# Patient Record
Sex: FEMALE | Race: WHITE | NOT HISPANIC OR LATINO | URBAN - METROPOLITAN AREA
[De-identification: names, ages, dates, MRNs, and addresses within clinical notes are randomized per-mention and may not be internally consistent; named-entity substitution may affect disease eponyms.]

---

## 2018-01-02 ENCOUNTER — EMERGENCY (EMERGENCY)
Facility: HOSPITAL | Age: 43
LOS: 1 days | End: 2018-01-02
Payer: COMMERCIAL

## 2018-01-02 PROCEDURE — 71045 X-RAY EXAM CHEST 1 VIEW: CPT | Mod: 26

## 2018-01-02 PROCEDURE — 99285 EMERGENCY DEPT VISIT HI MDM: CPT

## 2018-10-09 ENCOUNTER — EMERGENCY (EMERGENCY)
Facility: HOSPITAL | Age: 43
LOS: 1 days | End: 2018-10-09
Payer: COMMERCIAL

## 2018-10-09 PROCEDURE — 71045 X-RAY EXAM CHEST 1 VIEW: CPT | Mod: 26

## 2018-10-09 PROCEDURE — 99284 EMERGENCY DEPT VISIT MOD MDM: CPT

## 2021-08-12 ENCOUNTER — OUTPATIENT (OUTPATIENT)
Dept: OUTPATIENT SERVICES | Facility: HOSPITAL | Age: 46
LOS: 1 days | End: 2021-08-12
Payer: COMMERCIAL

## 2021-08-12 PROCEDURE — 93010 ELECTROCARDIOGRAM REPORT: CPT

## 2021-08-16 ENCOUNTER — APPOINTMENT (OUTPATIENT)
Dept: DISASTER EMERGENCY | Facility: CLINIC | Age: 46
End: 2021-08-16

## 2021-08-16 DIAGNOSIS — Z01.818 ENCOUNTER FOR OTHER PREPROCEDURAL EXAMINATION: ICD-10-CM

## 2021-08-16 PROBLEM — Z00.00 ENCOUNTER FOR PREVENTIVE HEALTH EXAMINATION: Status: ACTIVE | Noted: 2021-08-16

## 2021-08-17 LAB — SARS-COV-2 N GENE NPH QL NAA+PROBE: NOT DETECTED

## 2021-08-19 ENCOUNTER — OUTPATIENT (OUTPATIENT)
Dept: OUTPATIENT SERVICES | Facility: HOSPITAL | Age: 46
LOS: 1 days | End: 2021-08-19

## 2021-08-21 ENCOUNTER — EMERGENCY (EMERGENCY)
Facility: HOSPITAL | Age: 46
LOS: 1 days | End: 2021-08-21
Admitting: EMERGENCY MEDICINE
Payer: COMMERCIAL

## 2021-08-21 PROCEDURE — 99284 EMERGENCY DEPT VISIT MOD MDM: CPT

## 2021-08-22 ENCOUNTER — EMERGENCY (EMERGENCY)
Facility: HOSPITAL | Age: 46
LOS: 1 days | End: 2021-08-22
Admitting: EMERGENCY MEDICINE
Payer: COMMERCIAL

## 2021-08-22 PROCEDURE — 99283 EMERGENCY DEPT VISIT LOW MDM: CPT

## 2021-08-23 ENCOUNTER — EMERGENCY (EMERGENCY)
Facility: HOSPITAL | Age: 46
LOS: 1 days | End: 2021-08-23
Admitting: EMERGENCY MEDICINE
Payer: COMMERCIAL

## 2021-08-23 PROCEDURE — 99284 EMERGENCY DEPT VISIT MOD MDM: CPT

## 2022-07-26 ENCOUNTER — OUTPATIENT (OUTPATIENT)
Dept: OUTPATIENT SERVICES | Facility: HOSPITAL | Age: 47
LOS: 1 days | End: 2022-07-26
Payer: COMMERCIAL

## 2022-07-26 DIAGNOSIS — R79.9 ABNORMAL FINDING OF BLOOD CHEMISTRY, UNSPECIFIED: ICD-10-CM

## 2022-07-27 ENCOUNTER — APPOINTMENT (OUTPATIENT)
Dept: HEMATOLOGY ONCOLOGY | Facility: CLINIC | Age: 47
End: 2022-07-27

## 2022-07-27 ENCOUNTER — RESULT REVIEW (OUTPATIENT)
Age: 47
End: 2022-07-27

## 2022-07-27 VITALS
HEART RATE: 76 BPM | WEIGHT: 117 LBS | TEMPERATURE: 97.9 F | HEIGHT: 63 IN | DIASTOLIC BLOOD PRESSURE: 76 MMHG | SYSTOLIC BLOOD PRESSURE: 118 MMHG | BODY MASS INDEX: 20.73 KG/M2

## 2022-07-27 DIAGNOSIS — R92.2 INCONCLUSIVE MAMMOGRAM: ICD-10-CM

## 2022-07-27 LAB
BASOPHILS # BLD AUTO: 0.03 K/UL — SIGNIFICANT CHANGE UP (ref 0–0.2)
BASOPHILS NFR BLD AUTO: 0.6 % — SIGNIFICANT CHANGE UP (ref 0–2)
EOSINOPHIL # BLD AUTO: 0.16 K/UL — SIGNIFICANT CHANGE UP (ref 0–0.5)
EOSINOPHIL NFR BLD AUTO: 3.3 % — SIGNIFICANT CHANGE UP (ref 0–6)
HCT VFR BLD CALC: 39.1 % — SIGNIFICANT CHANGE UP (ref 34.5–45)
HGB BLD-MCNC: 13.3 G/DL — SIGNIFICANT CHANGE UP (ref 11.5–15.5)
IMM GRANULOCYTES NFR BLD AUTO: 0.2 % — SIGNIFICANT CHANGE UP (ref 0–1.5)
LYMPHOCYTES # BLD AUTO: 1.75 K/UL — SIGNIFICANT CHANGE UP (ref 1–3.3)
LYMPHOCYTES # BLD AUTO: 36 % — SIGNIFICANT CHANGE UP (ref 13–44)
MCHC RBC-ENTMCNC: 32.1 PG — SIGNIFICANT CHANGE UP (ref 27–34)
MCHC RBC-ENTMCNC: 34 GM/DL — SIGNIFICANT CHANGE UP (ref 32–36)
MCV RBC AUTO: 94.4 FL — SIGNIFICANT CHANGE UP (ref 80–100)
MONOCYTES # BLD AUTO: 0.39 K/UL — SIGNIFICANT CHANGE UP (ref 0–0.9)
MONOCYTES NFR BLD AUTO: 8 % — SIGNIFICANT CHANGE UP (ref 2–14)
NEUTROPHILS # BLD AUTO: 2.52 K/UL — SIGNIFICANT CHANGE UP (ref 1.8–7.4)
NEUTROPHILS NFR BLD AUTO: 51.9 % — SIGNIFICANT CHANGE UP (ref 43–77)
NRBC # BLD: 0 /100 WBCS — SIGNIFICANT CHANGE UP (ref 0–0)
PLATELET # BLD AUTO: 167 K/UL — SIGNIFICANT CHANGE UP (ref 150–400)
RBC # BLD: 4.14 M/UL — SIGNIFICANT CHANGE UP (ref 3.8–5.2)
RBC # FLD: 12.2 % — SIGNIFICANT CHANGE UP (ref 10.3–14.5)
WBC # BLD: 4.86 K/UL — SIGNIFICANT CHANGE UP (ref 3.8–10.5)
WBC # FLD AUTO: 4.86 K/UL — SIGNIFICANT CHANGE UP (ref 3.8–10.5)

## 2022-07-27 PROCEDURE — 85027 COMPLETE CBC AUTOMATED: CPT

## 2022-07-27 PROCEDURE — 36415 COLL VENOUS BLD VENIPUNCTURE: CPT

## 2022-07-27 PROCEDURE — 99205 OFFICE O/P NEW HI 60 MIN: CPT

## 2022-07-27 RX ORDER — EPINEPHRINE 0.15 MG/.3ML
0.15 INJECTION INTRAMUSCULAR
Refills: 0 | Status: ACTIVE | COMMUNITY

## 2022-07-27 RX ORDER — ESCITALOPRAM OXALATE 20 MG/1
20 TABLET ORAL
Qty: 90 | Refills: 0 | Status: ACTIVE | COMMUNITY
Start: 2022-03-30

## 2022-07-27 RX ORDER — RIMEGEPANT SULFATE 75 MG/75MG
75 TABLET, ORALLY DISINTEGRATING ORAL
Qty: 16 | Refills: 0 | Status: ACTIVE | COMMUNITY
Start: 2022-07-15

## 2022-07-27 NOTE — RESULTS/DATA
[FreeTextEntry1] : Ms. Silva presented at age 47 in July 2022 for evaluation of von willebrand disease, elevated ferritin. \par The patient has a medical history of anxiety, Anorexia nervosa, SVT s/p cardiac ablation, migraines, osteopenia, von willebrand disease, unknown type. \par \par Will obtain prior records. \par Baseline labs today. \par Consider DDAVP prior to colonoscopy. \par \par Laboratory studies reviewed at today's visit and notable for: WBC 4.86, Hb 13.3, Plt 167

## 2022-07-27 NOTE — CONSULT LETTER
[Dear  ___] : Dear  [unfilled], [Courtesy Letter:] : I had the pleasure of seeing your patient, [unfilled], in my office today. [Please see my note below.] : Please see my note below. [Consult Closing:] : Thank you very much for allowing me to participate in the care of this patient.  If you have any questions, please do not hesitate to contact me. [Sincerely,] : Sincerely, [FreeTextEntry2] : Dr. Elda Juarez  [FreeTextEntry3] : Dr. Citlali Bhat\par

## 2022-07-27 NOTE — HISTORY OF PRESENT ILLNESS
[de-identified] : Referred by: Dr. Elda Juarez\par \par Ms. Silva presented at age 47 in July 2022 for evaluation of von willebrand disease, elevated ferritin. \par The patient has a medical history of anxiety, Anorexia nervosa, SVT s/p cardiac ablation, migraines, osteopenia, von willebrand disease, unknown type. \par \par The patient is here to transfer care from [facility].  She reports that she was previously diagnosed with von Willebrand disorder by a hematologist in [facility] Dr. Humza Sorto at [facility] over 20 years ago.  She underwent this work-up because she had extremely heavy bleeding with her menses and had an episode of hemorrhage after oral surgery. The patient had 1 vaginal delivery 12 years ago which was unremarkable, no major bleeding noted. She was unable to get pregnant after that delivery. She subsequently underwent uterine ablation and had significant improvement in her menses. She had a recent nasal procedure and required 3 doses of DDAVP (Stimate) to control her bleeding. She never required a blood transfusion. She also reports that her grandmother had a bleeding disorder, likely von Willebrand as well.  \par \par She has seen multiple hematologists over the past several years who told her that she did not in fact have VWD, however she continues to suffer from bleeding episodes with surgery. She moved out to [facility] in 2003 and most recently was following with Dr. Gamboa and Dr. Underwood. She was also receiving intermittent IV iron infusions for iron deficiency anemia, most recently 6 years ago. She recently was found to have elevated ferritin which is being monitored. \par \par She reports a significant history of Anorexia, gained 30lb over the past year and is doing a lot of yoga, meditation, acupuncture, massage. She does report generally low blood pressure and has intermittent dizziness. She also reports chronic fatigue. She reports that she is due for a colonoscopy, had some blood in her stools. 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

## 2022-07-27 NOTE — PHYSICAL EXAM
[Fully active, able to carry on all pre-disease performance without restriction] : Status 0 - Fully active, able to carry on all pre-disease performance without restriction [Normal] : affect appropriate [de-identified] : generally well appearing young female, NAD, pleasant

## 2022-07-28 ENCOUNTER — NON-APPOINTMENT (OUTPATIENT)
Age: 47
End: 2022-07-28

## 2022-07-28 LAB
ALBUMIN SERPL ELPH-MCNC: 4.6 G/DL
ALP BLD-CCNC: 85 U/L
ALT SERPL-CCNC: 17 U/L
ANION GAP SERPL CALC-SCNC: 10 MMOL/L
APTT BLD: 33.6 SEC
AST SERPL-CCNC: 18 U/L
BILIRUB SERPL-MCNC: 0.2 MG/DL
BUN SERPL-MCNC: 7 MG/DL
CALCIUM SERPL-MCNC: 9.2 MG/DL
CHLORIDE SERPL-SCNC: 103 MMOL/L
CO2 SERPL-SCNC: 27 MMOL/L
CREAT SERPL-MCNC: 0.66 MG/DL
EGFR: 108 ML/MIN/1.73M2
FERRITIN SERPL-MCNC: 400 NG/ML
FOLATE SERPL-MCNC: 14.7 NG/ML
GLUCOSE SERPL-MCNC: 83 MG/DL
INR PPP: 0.91 RATIO
IRON SATN MFR SERPL: 29 %
IRON SERPL-MCNC: 82 UG/DL
POTASSIUM SERPL-SCNC: 4.3 MMOL/L
PROT SERPL-MCNC: 6.7 G/DL
PT BLD: 10.7 SEC
SODIUM SERPL-SCNC: 140 MMOL/L
TIBC SERPL-MCNC: 283 UG/DL
UIBC SERPL-MCNC: 201 UG/DL
VIT B12 SERPL-MCNC: 628 PG/ML
VWF AG PPP IA-ACNC: 89 %

## 2022-07-29 ENCOUNTER — APPOINTMENT (OUTPATIENT)
Dept: MAMMOGRAPHY | Facility: CLINIC | Age: 47
End: 2022-07-29

## 2022-07-29 ENCOUNTER — RESULT REVIEW (OUTPATIENT)
Age: 47
End: 2022-07-29

## 2022-07-29 PROCEDURE — 77067 SCR MAMMO BI INCL CAD: CPT

## 2022-07-29 PROCEDURE — 77063 BREAST TOMOSYNTHESIS BI: CPT

## 2022-08-03 LAB — VWF MULTIMERS PPP IA-ACNC: NORMAL

## 2022-08-15 ENCOUNTER — APPOINTMENT (OUTPATIENT)
Dept: HEMATOLOGY ONCOLOGY | Facility: CLINIC | Age: 47
End: 2022-08-15

## 2022-09-15 ENCOUNTER — OUTPATIENT (OUTPATIENT)
Dept: OUTPATIENT SERVICES | Facility: HOSPITAL | Age: 47
LOS: 1 days | End: 2022-09-15
Payer: COMMERCIAL

## 2022-09-15 DIAGNOSIS — R79.9 ABNORMAL FINDING OF BLOOD CHEMISTRY, UNSPECIFIED: ICD-10-CM

## 2022-09-19 ENCOUNTER — APPOINTMENT (OUTPATIENT)
Dept: HEMATOLOGY ONCOLOGY | Facility: CLINIC | Age: 47
End: 2022-09-19

## 2022-10-06 ENCOUNTER — APPOINTMENT (OUTPATIENT)
Dept: HEMATOLOGY ONCOLOGY | Facility: CLINIC | Age: 47
End: 2022-10-06

## 2022-10-07 ENCOUNTER — RESULT REVIEW (OUTPATIENT)
Age: 47
End: 2022-10-07

## 2022-10-07 ENCOUNTER — APPOINTMENT (OUTPATIENT)
Dept: HEMATOLOGY ONCOLOGY | Facility: CLINIC | Age: 47
End: 2022-10-07

## 2022-10-07 LAB
BASOPHILS # BLD AUTO: 0.03 K/UL — SIGNIFICANT CHANGE UP (ref 0–0.2)
BASOPHILS NFR BLD AUTO: 0.5 % — SIGNIFICANT CHANGE UP (ref 0–2)
EOSINOPHIL # BLD AUTO: 0.21 K/UL — SIGNIFICANT CHANGE UP (ref 0–0.5)
EOSINOPHIL NFR BLD AUTO: 3.7 % — SIGNIFICANT CHANGE UP (ref 0–6)
HCT VFR BLD CALC: 35.9 % — SIGNIFICANT CHANGE UP (ref 34.5–45)
HGB BLD-MCNC: 12.2 G/DL — SIGNIFICANT CHANGE UP (ref 11.5–15.5)
IMM GRANULOCYTES NFR BLD AUTO: 0.2 % — SIGNIFICANT CHANGE UP (ref 0–0.9)
LYMPHOCYTES # BLD AUTO: 2.26 K/UL — SIGNIFICANT CHANGE UP (ref 1–3.3)
LYMPHOCYTES # BLD AUTO: 39.4 % — SIGNIFICANT CHANGE UP (ref 13–44)
MCHC RBC-ENTMCNC: 31.6 PG — SIGNIFICANT CHANGE UP (ref 27–34)
MCHC RBC-ENTMCNC: 34 GM/DL — SIGNIFICANT CHANGE UP (ref 32–36)
MCV RBC AUTO: 93 FL — SIGNIFICANT CHANGE UP (ref 80–100)
MONOCYTES # BLD AUTO: 0.36 K/UL — SIGNIFICANT CHANGE UP (ref 0–0.9)
MONOCYTES NFR BLD AUTO: 6.3 % — SIGNIFICANT CHANGE UP (ref 2–14)
NEUTROPHILS # BLD AUTO: 2.86 K/UL — SIGNIFICANT CHANGE UP (ref 1.8–7.4)
NEUTROPHILS NFR BLD AUTO: 49.9 % — SIGNIFICANT CHANGE UP (ref 43–77)
NRBC # BLD: 0 /100 WBCS — SIGNIFICANT CHANGE UP (ref 0–0)
PLATELET # BLD AUTO: 167 K/UL — SIGNIFICANT CHANGE UP (ref 150–400)
RBC # BLD: 3.86 M/UL — SIGNIFICANT CHANGE UP (ref 3.8–5.2)
RBC # FLD: 12.4 % — SIGNIFICANT CHANGE UP (ref 10.3–14.5)
WBC # BLD: 5.73 K/UL — SIGNIFICANT CHANGE UP (ref 3.8–10.5)
WBC # FLD AUTO: 5.73 K/UL — SIGNIFICANT CHANGE UP (ref 3.8–10.5)

## 2022-10-07 PROCEDURE — 36415 COLL VENOUS BLD VENIPUNCTURE: CPT

## 2022-10-07 PROCEDURE — 99214 OFFICE O/P EST MOD 30 MIN: CPT

## 2022-10-07 PROCEDURE — 85027 COMPLETE CBC AUTOMATED: CPT

## 2022-10-07 NOTE — PHYSICAL EXAM
[Fully active, able to carry on all pre-disease performance without restriction] : Status 0 - Fully active, able to carry on all pre-disease performance without restriction [Normal] : affect appropriate [de-identified] : generally well appearing young female, NAD, pleasant

## 2022-10-07 NOTE — HISTORY OF PRESENT ILLNESS
[de-identified] : Referred by: Dr. Elda Juarez\par \par Ms. Silva presented at age 47 in July 2022 for evaluation of von willebrand disease, elevated ferritin. \par The patient has a medical history of anxiety, Anorexia nervosa, SVT s/p cardiac ablation, migraines, osteopenia, von willebrand disease, unknown type. \par \par Presenting HPI: The patient is here to transfer care from [facility].  She reports that she was previously diagnosed with von Willebrand disorder by a hematologist in [facility] Dr. Humza Sorto at [facility] over 20 years ago.  She underwent this work-up because she had extremely heavy bleeding with her menses and had an episode of hemorrhage after oral surgery. The patient had 1 vaginal delivery 12 years ago which was unremarkable, no major bleeding noted. She was unable to get pregnant after that delivery. She subsequently underwent uterine ablation and had significant improvement in her menses. She had a recent nasal procedure and required 3 doses of DDAVP (Stimate) to control her bleeding. She never required a blood transfusion. She also reports that her grandmother had a bleeding disorder, likely von Willebrand as well.  \par \par She has seen multiple hematologists over the past several years who told her that she did not in fact have VWD, however she continues to suffer from bleeding episodes with surgery. She moved out to [facility] in 2003 and most recently was following with Dr. Gamboa and Dr. Underwood. She was also receiving intermittent IV iron infusions for iron deficiency anemia, most recently 6 years ago. She recently was found to have elevated ferritin which is being monitored. \par \par She reports a significant history of Anorexia, gained 30lb over the past year and is doing a lot of yoga, meditation, acupuncture, massage. She does report generally low blood pressure and has intermittent dizziness. She also reports chronic fatigue. She reports that she is due for a colonoscopy, had some blood in her stools. 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 \par \par Labs from 7/27/22 reviewed: WBC 4.86, Hb 13.3, Plt 167\par B12 628, folate 14.7 WNL \par Ferritin 400, iron studies normal \par CMP wnl, coags wnl \par VWF antigen 89% WNL, multimers normal \par \par Mammo 7/29/22- negative, BIRADS 1\par \par Prior records from [facility] reviewed: \par Elevated ferritin levels- hemochromatosis testing negative, AYAH neg\par - Ferritin 568 in 9/2021 \par - Ferritin 704 1/2021\par Ferritin elevated since iron infusion in 2019\par \par At today's visit she is feeling very well. She denies any fevers, chills, CP, SOB, n/v/d, bleeding/bruising. She has a good appetite. \par \par Laboratory studies reviewed at today's visit and notable for: WBC 5.73, Hb 12.2, Plt 167\par \par     HCM: \par     - MMG 7/29/22- BIRADS 1 negative\par     - DUE for pap smear, has appt October \par     - DUE for DEXA\par     - DUE for colonoscopy

## 2022-10-07 NOTE — RESULTS/DATA
[FreeTextEntry1] : Ms. Silva presented at age 47 in July 2022 for evaluation of von willebrand disease, elevated ferritin. \par The patient has a medical history of anxiety, Anorexia nervosa, SVT s/p cardiac ablation, migraines, osteopenia, von willebrand disease, unknown type. \par \par Von willebrand studies negative but clinically + for VWD. \par Recommend DDAVP prior to invasive procedures including colonoscopy. \par Refer to GI for c-scope. \par Ferritin downtrending, continue to monitor. \par F/U July 2023 or as needed.

## 2022-10-10 LAB — FERRITIN SERPL-MCNC: 438 NG/ML

## 2023-01-17 ENCOUNTER — APPOINTMENT (OUTPATIENT)
Dept: MRI IMAGING | Facility: CLINIC | Age: 48
End: 2023-01-17
Payer: COMMERCIAL

## 2023-01-17 PROCEDURE — A9585: CPT

## 2023-01-17 PROCEDURE — 77049 MRI BREAST C-+ W/CAD BI: CPT

## 2023-03-07 ENCOUNTER — APPOINTMENT (OUTPATIENT)
Dept: ORTHOPEDIC SURGERY | Facility: CLINIC | Age: 48
End: 2023-03-07
Payer: COMMERCIAL

## 2023-03-07 ENCOUNTER — FORM ENCOUNTER (OUTPATIENT)
Age: 48
End: 2023-03-07

## 2023-03-07 VITALS — BODY MASS INDEX: 20.73 KG/M2 | WEIGHT: 117 LBS | HEIGHT: 63 IN

## 2023-03-07 DIAGNOSIS — M25.559 PAIN IN UNSPECIFIED HIP: ICD-10-CM

## 2023-03-07 DIAGNOSIS — M87.051 IDIOPATHIC ASEPTIC NECROSIS OF RIGHT FEMUR: ICD-10-CM

## 2023-03-07 PROCEDURE — 73030 X-RAY EXAM OF SHOULDER: CPT | Mod: LT

## 2023-03-07 PROCEDURE — 73502 X-RAY EXAM HIP UNI 2-3 VIEWS: CPT

## 2023-03-07 PROCEDURE — 99203 OFFICE O/P NEW LOW 30 MIN: CPT

## 2023-03-07 NOTE — HISTORY OF PRESENT ILLNESS
[de-identified] : Patient presents for evaluation of left shoulder and right hip pain.  Regarding the left shoulder, she denies any specific trauma or inciting event but reports attending a weight training class about 4 months ago and ever since has noticed limited range of motion and pain.  She is RHD, but uses left hand to throw and for lifting.  Regarding the right hip, she reports groin pain for the past 2 months.  She has history of anorexia and reports being osteopenic from this.  She is concerned about the groin pain that happens even when lying down.

## 2023-03-07 NOTE — PHYSICAL EXAM
[4 ___] : forward flexion 4[unfilled]/5 [Left] : left shoulder [There are no fractures, subluxations or dislocations. No significant abnormalities are seen] : There are no fractures, subluxations or dislocations. No significant abnormalities are seen [de-identified] : active abduction 60 degrees [TWNoteComboBox6] : internal rotation sacrum [de-identified] : external rotation 20 degrees [4___] : flexion 4[unfilled]/5 [2+] : posterior tibialis pulse: 2+ [] : patient ambulates without assistive device [Right] : right hip with pelvis [AP] : anteroposterior [Lateral] : lateral [FreeTextEntry9] : subchondral lucency along lateral femoral head concerning for early AVN [TWNoteComboBox7] : flexion 100 degrees

## 2023-03-07 NOTE — DISCUSSION/SUMMARY
[de-identified] : I reviewed patient's radiographs and discussed her condition and treatment options.  I advised starting PT for the shoulder.  I recommended MRI of the right hip.  Patient voiced understanding and agreement with the plan.\par

## 2023-03-16 ENCOUNTER — FORM ENCOUNTER (OUTPATIENT)
Age: 48
End: 2023-03-16

## 2023-03-17 ENCOUNTER — APPOINTMENT (OUTPATIENT)
Dept: MRI IMAGING | Facility: CLINIC | Age: 48
End: 2023-03-17
Payer: COMMERCIAL

## 2023-03-17 ENCOUNTER — RESULT REVIEW (OUTPATIENT)
Age: 48
End: 2023-03-17

## 2023-03-17 PROCEDURE — 73721 MRI JNT OF LWR EXTRE W/O DYE: CPT | Mod: RT

## 2023-03-21 ENCOUNTER — APPOINTMENT (OUTPATIENT)
Dept: ORTHOPEDIC SURGERY | Facility: CLINIC | Age: 48
End: 2023-03-21
Payer: COMMERCIAL

## 2023-03-21 DIAGNOSIS — M70.61 TROCHANTERIC BURSITIS, RIGHT HIP: ICD-10-CM

## 2023-03-21 PROCEDURE — 99214 OFFICE O/P EST MOD 30 MIN: CPT

## 2023-03-21 NOTE — PHYSICAL EXAM
[4___] : flexion 4[unfilled]/5 [2+] : posterior tibialis pulse: 2+ [Right] : right hip with pelvis [AP] : anteroposterior [Lateral] : lateral [FreeTextEntry9] : subchondral lucency along lateral femoral head concerning for early AVN [] : no ecchymosis [TWNoteComboBox7] : flexion 100 degrees

## 2023-03-21 NOTE — DISCUSSION/SUMMARY
[de-identified] : I reviewed patient's MRI (and further with Dr. Barajas who confirmed no signs of stress fracture) and discussed her condition and treatment options.  Plan to start PT on both shoulder and hip.  Follow up in 4 weeks.  Follow up with gynecologist regarding need to order new DEXA scan.  Patient voiced understanding and agreement with the plan.\par

## 2023-03-21 NOTE — DATA REVIEWED
[MRI] : MRI [Right] : of the right [Hip] : hip [Report was reviewed and noted in the chart] : The report was reviewed and noted in the chart [I independently reviewed and interpreted images and report] : I independently reviewed and interpreted images and report [I reviewed the films/CD and agree] : I reviewed the films/CD and agree [FreeTextEntry1] : Mild to moderate insertional tendinosis of the gluteus minimus and medius tendons with surrounding peritendinous edema along the lateral insertional fibers. Peritendinous edema extends into the adjacent greater trochanteric bursa consistent with mild bursitis.\par \par No evidence of acute fracture or osteonecrosis.

## 2023-04-18 ENCOUNTER — APPOINTMENT (OUTPATIENT)
Dept: ORTHOPEDIC SURGERY | Facility: CLINIC | Age: 48
End: 2023-04-18

## 2023-06-02 ENCOUNTER — NON-APPOINTMENT (OUTPATIENT)
Age: 48
End: 2023-06-02

## 2023-06-02 ENCOUNTER — APPOINTMENT (OUTPATIENT)
Dept: ORTHOPEDIC SURGERY | Facility: CLINIC | Age: 48
End: 2023-06-02
Payer: OTHER MISCELLANEOUS

## 2023-06-02 DIAGNOSIS — S46.012A STRAIN OF MUSCLE(S) AND TENDON(S) OF THE ROTATOR CUFF OF LEFT SHOULDER, INITIAL ENCOUNTER: ICD-10-CM

## 2023-06-02 PROCEDURE — 99214 OFFICE O/P EST MOD 30 MIN: CPT

## 2023-06-02 NOTE — PHYSICAL EXAM
[3 ___] : forward flexion 3[unfilled]/5 [] : motor and sensory intact distally [Left] : left shoulder [Outside films reviewed] : Outside films reviewed [There are no fractures, subluxations or dislocations. No significant abnormalities are seen] : There are no fractures, subluxations or dislocations. No significant abnormalities are seen [TWNoteComboBox7] : active forward flexion 40 degrees [de-identified] : external rotation 10 degrees

## 2023-06-02 NOTE — HISTORY OF PRESENT ILLNESS
[Work related] : work related [Sudden] : sudden [de-identified] : Patient presents for evaluation on LT shoulder pain. States she had a trip and fall yesterday at work. 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 Presented to [facility]. Patient has limited ROM. Patient uses her LT arm more but patient is RHD.   [FreeTextEntry3] : 6/1/2023

## 2023-06-02 NOTE — DISCUSSION/SUMMARY
[de-identified] : I reviewed patient's radiographs and discussed her condition and treatment options.  I advised obtaining MRI.  In the interim, I recommended wearing a sling.  Patient voiced understanding and agreement with the plan.\par

## 2023-06-11 ENCOUNTER — FORM ENCOUNTER (OUTPATIENT)
Age: 48
End: 2023-06-11

## 2023-06-12 ENCOUNTER — APPOINTMENT (OUTPATIENT)
Dept: MRI IMAGING | Facility: CLINIC | Age: 48
End: 2023-06-12

## 2023-06-13 ENCOUNTER — FORM ENCOUNTER (OUTPATIENT)
Age: 48
End: 2023-06-13

## 2023-06-20 ENCOUNTER — FORM ENCOUNTER (OUTPATIENT)
Age: 48
End: 2023-06-20

## 2023-06-21 ENCOUNTER — TRANSCRIPTION ENCOUNTER (OUTPATIENT)
Age: 48
End: 2023-06-21

## 2023-06-21 ENCOUNTER — RESULT REVIEW (OUTPATIENT)
Age: 48
End: 2023-06-21

## 2023-06-21 ENCOUNTER — APPOINTMENT (OUTPATIENT)
Dept: MRI IMAGING | Facility: CLINIC | Age: 48
End: 2023-06-21
Payer: OTHER MISCELLANEOUS

## 2023-06-21 PROCEDURE — 73221 MRI JOINT UPR EXTREM W/O DYE: CPT | Mod: LT

## 2023-06-27 ENCOUNTER — APPOINTMENT (OUTPATIENT)
Dept: ORTHOPEDIC SURGERY | Facility: CLINIC | Age: 48
End: 2023-06-27
Payer: OTHER MISCELLANEOUS

## 2023-06-27 PROCEDURE — 20610 DRAIN/INJ JOINT/BURSA W/O US: CPT

## 2023-06-27 PROCEDURE — 99214 OFFICE O/P EST MOD 30 MIN: CPT | Mod: 25

## 2023-06-27 NOTE — HISTORY OF PRESENT ILLNESS
[Work related] : work related [Sudden] : sudden [de-identified] : Patient presents for MRI results. States that she is still having trouble brushing her hair and sleeping at night.  Despite taking tylenol and wearing a sling, she is still in pain. [FreeTextEntry3] : 6/1/2023

## 2023-06-27 NOTE — DISCUSSION/SUMMARY
[de-identified] : I reviewed patient's MRI and discussed her condition and treatment options.  She tolerated injection well today.  Start PT after return from vacation.  Follow up in 4 weeks.  Patient voiced understanding and agreement with the plan.\par

## 2023-06-27 NOTE — PHYSICAL EXAM
[3 ___] : forward flexion 3[unfilled]/5 [Left] : left shoulder [Outside films reviewed] : Outside films reviewed [There are no fractures, subluxations or dislocations. No significant abnormalities are seen] : There are no fractures, subluxations or dislocations. No significant abnormalities are seen [] : no ecchymosis [FreeTextEntry3] : wearing sling [TWNoteComboBox7] : active forward flexion 90 degrees [de-identified] : external rotation 30 degrees

## 2023-06-27 NOTE — DATA REVIEWED
[MRI] : MRI [Shoulder] : shoulder [Report was reviewed and noted in the chart] : The report was reviewed and noted in the chart [I independently reviewed and interpreted images and report] : I independently reviewed and interpreted images and report [I reviewed the films/CD and agree] : I reviewed the films/CD and agree [Left] : left [FreeTextEntry1] : Prominent edema and thickening involving both the anterior and posterior bands of the inferior glenohumeral ligament. Given history of trauma this is likely related to moderate sprain. Alternative consideration would be adhesive capsulitis.\par \par Trace subarticular edema along the posterior margin of the humeral head which may be related to an osseous contusion.\par \par Subacromial/subdeltoid bursitis.\par \par Posterior superior to posterior inferior glenoid labrum is blunted and diminutive in appearance.

## 2023-06-27 NOTE — PROCEDURE
[Large Joint Injection] : Large joint injection [Left] : of the left [Pain] : pain [Inflammation] : inflammation [Alcohol] : alcohol [Betadine] : betadine [Ethyl Chloride sprayed topically] : ethyl chloride sprayed topically [Sterile technique used] : sterile technique used [___ cc    3mg] :  Betamethasone (Celestone) ~Vcc of 3mg [___ cc    1%] : Lidocaine ~Vcc of 1%  [] : Patient tolerated procedure well [Call if redness, pain or fever occur] : call if redness, pain or fever occur [Apply ice for 15min out of every hour for the next 12-24 hours as tolerated] : apply ice for 15 minutes out of every hour for the next 12-24 hours as tolerated [Patient was advised to rest the joint(s) for ____ days] : patient was advised to rest the joint(s) for [unfilled] days [Previous OTC use and PT nontherapeutic] : patient has tried OTC's including aspirin, Ibuprofen, Aleve, etc or prescription NSAIDS, and/or exercises at home and/or physical therapy without satisfactory response [Patient had decreased mobility in the joint] : patient had decreased mobility in the joint [Risks, benefits, alternatives discussed / Verbal consent obtained] : the risks benefits, and alternatives have been discussed, and verbal consent was obtained [Glenohumeral Joint] : glenohumeral joint

## 2023-07-25 ENCOUNTER — OUTPATIENT (OUTPATIENT)
Dept: OUTPATIENT SERVICES | Facility: HOSPITAL | Age: 48
LOS: 1 days | End: 2023-07-25
Payer: COMMERCIAL

## 2023-07-25 DIAGNOSIS — R79.9 ABNORMAL FINDING OF BLOOD CHEMISTRY, UNSPECIFIED: ICD-10-CM

## 2023-07-27 ENCOUNTER — RESULT REVIEW (OUTPATIENT)
Age: 48
End: 2023-07-27

## 2023-07-27 ENCOUNTER — APPOINTMENT (OUTPATIENT)
Dept: HEMATOLOGY ONCOLOGY | Facility: CLINIC | Age: 48
End: 2023-07-27
Payer: COMMERCIAL

## 2023-07-27 VITALS
BODY MASS INDEX: 20.38 KG/M2 | OXYGEN SATURATION: 100 % | TEMPERATURE: 98.2 F | DIASTOLIC BLOOD PRESSURE: 73 MMHG | HEART RATE: 79 BPM | HEIGHT: 63 IN | SYSTOLIC BLOOD PRESSURE: 112 MMHG | WEIGHT: 115 LBS

## 2023-07-27 LAB
BASOPHILS # BLD AUTO: 0.02 K/UL — SIGNIFICANT CHANGE UP (ref 0–0.2)
BASOPHILS NFR BLD AUTO: 0.3 % — SIGNIFICANT CHANGE UP (ref 0–2)
EOSINOPHIL # BLD AUTO: 0.12 K/UL — SIGNIFICANT CHANGE UP (ref 0–0.5)
EOSINOPHIL NFR BLD AUTO: 2 % — SIGNIFICANT CHANGE UP (ref 0–6)
HCT VFR BLD CALC: 39.2 % — SIGNIFICANT CHANGE UP (ref 34.5–45)
HGB BLD-MCNC: 12.8 G/DL — SIGNIFICANT CHANGE UP (ref 11.5–15.5)
IMM GRANULOCYTES NFR BLD AUTO: 0.2 % — SIGNIFICANT CHANGE UP (ref 0–0.9)
LYMPHOCYTES # BLD AUTO: 2.17 K/UL — SIGNIFICANT CHANGE UP (ref 1–3.3)
LYMPHOCYTES # BLD AUTO: 36.4 % — SIGNIFICANT CHANGE UP (ref 13–44)
MCHC RBC-ENTMCNC: 30.8 PG — SIGNIFICANT CHANGE UP (ref 27–34)
MCHC RBC-ENTMCNC: 32.7 GM/DL — SIGNIFICANT CHANGE UP (ref 32–36)
MCV RBC AUTO: 94.2 FL — SIGNIFICANT CHANGE UP (ref 80–100)
MONOCYTES # BLD AUTO: 0.37 K/UL — SIGNIFICANT CHANGE UP (ref 0–0.9)
MONOCYTES NFR BLD AUTO: 6.2 % — SIGNIFICANT CHANGE UP (ref 2–14)
NEUTROPHILS # BLD AUTO: 3.27 K/UL — SIGNIFICANT CHANGE UP (ref 1.8–7.4)
NEUTROPHILS NFR BLD AUTO: 54.9 % — SIGNIFICANT CHANGE UP (ref 43–77)
NRBC # BLD: 0 /100 WBCS — SIGNIFICANT CHANGE UP (ref 0–0)
PLATELET # BLD AUTO: 185 K/UL — SIGNIFICANT CHANGE UP (ref 150–400)
RBC # BLD: 4.16 M/UL — SIGNIFICANT CHANGE UP (ref 3.8–5.2)
RBC # FLD: 12.8 % — SIGNIFICANT CHANGE UP (ref 10.3–14.5)
WBC # BLD: 5.96 K/UL — SIGNIFICANT CHANGE UP (ref 3.8–10.5)
WBC # FLD AUTO: 5.96 K/UL — SIGNIFICANT CHANGE UP (ref 3.8–10.5)

## 2023-07-27 PROCEDURE — 99214 OFFICE O/P EST MOD 30 MIN: CPT

## 2023-07-27 PROCEDURE — 85027 COMPLETE CBC AUTOMATED: CPT

## 2023-07-27 NOTE — RESULTS/DATA
[FreeTextEntry1] : Ms. Silva presented at age 47 in July 2022 for evaluation of von willebrand disease, elevated ferritin. \par The patient has a medical history of anxiety, Anorexia nervosa, SVT s/p cardiac ablation, migraines, osteopenia, von willebrand disease, unknown type. \par \par Von willebrand studies negative but clinically + for VWD. \par Continues to have persistent bruising. \par Check von willebrand activity. Consider repeat platelet aggregation studies. \par Recommend DDAVP prior to invasive procedures including colonoscopy/ possible breast biopsy. \par Refer to GI for c-scope. \par Ferritin downtrending, continue to monitor. \par Pending breast MRI. Dense breast tissue. \par All patient questions answered at today's visit. Patient urged to call the office with any questions or concerns.

## 2023-07-27 NOTE — HISTORY OF PRESENT ILLNESS
[de-identified] : Referred by: Dr. Elda Juarez\par \par Ms. Silva presented at age 47 in July 2022 for evaluation of von willebrand disease, elevated ferritin. \par The patient has a medical history of anxiety, Anorexia nervosa, SVT s/p cardiac ablation, migraines, osteopenia, von willebrand disease, unknown type. \par \par Presenting HPI: The patient is here to transfer care from [facility].  She reports that she was previously diagnosed with von Willebrand disorder by a hematologist in [facility] Dr. Humza Sorto at [facility] over 20 years ago.  She underwent this work-up because she had extremely heavy bleeding with her menses and had an episode of hemorrhage after oral surgery. The patient had 1 vaginal delivery 12 years ago which was unremarkable, no major bleeding noted. She was unable to get pregnant after that delivery. She subsequently underwent uterine ablation and had significant improvement in her menses. She had a recent nasal procedure and required 3 doses of DDAVP (Stimate) to control her bleeding. She never required a blood transfusion. She also reports that her grandmother had a bleeding disorder, likely von Willebrand as well.  \par \par She has seen multiple hematologists over the past several years who told her that she did not in fact have VWD, however she continues to suffer from bleeding episodes with surgery. She moved out to [facility] in 2003 and most recently was following with Dr. Gamboa and Dr. Underwood. She was also receiving intermittent IV iron infusions for iron deficiency anemia, most recently 6 years ago. She recently was found to have elevated ferritin which is being monitored. \par \par She reports a significant history of Anorexia, gained 30lb over the past year and is doing a lot of yoga, meditation, acupuncture, massage. She does report generally low blood pressure and has intermittent dizziness. She also reports chronic fatigue. She reports that she is due for a colonoscopy, had some blood in her stools. 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 \par \par At today's visit she is generally doing well. Her weight has been stable (has a hx of anorexia). She has significant bruising from scratching mosquito bites. She reports that she is pending a breast MRI for possible small breast mass- exam today shows nodular breasts w/ no discrete mass. She is s/p uterine ablation and denies vaginal bleeding. She denies blood in her urine or stools. \par \par Laboratory studies reviewed at today's visit and notable for: WBC 5.96, Hb 12.8, Plt 185\par \par #HCM: \par Mammo 7/29/22- negative, BIRADS 1- due for MRI breast 8/2023 \par Referral to GI for colonoscopy today\par Follow up GYN in October 2023 \par

## 2023-07-27 NOTE — PHYSICAL EXAM
[Fully active, able to carry on all pre-disease performance without restriction] : Status 0 - Fully active, able to carry on all pre-disease performance without restriction [Normal] : affect appropriate [de-identified] : generally well appearing young female, NAD, pleasant  [de-identified] : nodular breast tissue bilaterally, no discrete breast masses

## 2023-07-28 LAB
FERRITIN SERPL-MCNC: 483 NG/ML
IRON SATN MFR SERPL: 33 %
IRON SERPL-MCNC: 91 UG/DL
TIBC SERPL-MCNC: 273 UG/DL
UIBC SERPL-MCNC: 182 UG/DL

## 2023-08-01 ENCOUNTER — APPOINTMENT (OUTPATIENT)
Dept: ORTHOPEDIC SURGERY | Facility: CLINIC | Age: 48
End: 2023-08-01
Payer: OTHER MISCELLANEOUS

## 2023-08-01 PROCEDURE — 99213 OFFICE O/P EST LOW 20 MIN: CPT

## 2023-08-01 NOTE — DISCUSSION/SUMMARY
[de-identified] : I discussed her condition and treatment course.  Continue PT and HEP.  Follow up in 6 weeks.  Patient voiced understanding and agreement with the plan.

## 2023-08-01 NOTE — PHYSICAL EXAM
[Left] : left shoulder [3 ___] : forward flexion 3[unfilled]/5 [] : motor and sensory intact distally [FreeTextEntry3] : wearing sling [TWNoteComboBox7] : active forward flexion 105 degrees [de-identified] : active abduction 50 degrees [de-identified] : external rotation 40 degrees

## 2023-08-01 NOTE — HISTORY OF PRESENT ILLNESS
[de-identified] : Patient states her pain is still present but is improving since cortisone shot as well as PT. Patient states her ROM has increased a bit since last visit.

## 2023-08-02 ENCOUNTER — APPOINTMENT (OUTPATIENT)
Dept: MRI IMAGING | Facility: CLINIC | Age: 48
End: 2023-08-02
Payer: COMMERCIAL

## 2023-08-02 ENCOUNTER — NON-APPOINTMENT (OUTPATIENT)
Age: 48
End: 2023-08-02

## 2023-08-02 LAB — VWF:RCO ACT/NOR PPP PL AGG: 74 %

## 2023-08-02 PROCEDURE — 77049 MRI BREAST C-+ W/CAD BI: CPT

## 2023-08-02 PROCEDURE — A9585: CPT

## 2023-08-31 ENCOUNTER — NON-APPOINTMENT (OUTPATIENT)
Age: 48
End: 2023-08-31

## 2023-09-05 ENCOUNTER — APPOINTMENT (OUTPATIENT)
Dept: ORTHOPEDIC SURGERY | Facility: CLINIC | Age: 48
End: 2023-09-05
Payer: OTHER MISCELLANEOUS

## 2023-09-05 DIAGNOSIS — M25.512 PAIN IN LEFT SHOULDER: ICD-10-CM

## 2023-09-05 PROCEDURE — 20610 DRAIN/INJ JOINT/BURSA W/O US: CPT | Mod: LT

## 2023-09-05 PROCEDURE — 99214 OFFICE O/P EST MOD 30 MIN: CPT | Mod: 25

## 2023-09-06 PROBLEM — M25.512 ACUTE PAIN OF LEFT SHOULDER: Status: ACTIVE | Noted: 2023-03-07

## 2023-09-06 NOTE — PHYSICAL EXAM
[Left] : left shoulder [3 ___] : forward flexion 3[unfilled]/5 [] : no ecchymosis [FreeTextEntry3] : wearing sling [TWNoteComboBox7] : active forward flexion 110 degrees [de-identified] : active abduction 50 degrees [de-identified] : external rotation 40 degrees

## 2023-09-06 NOTE — HISTORY OF PRESENT ILLNESS
[de-identified] : Since last visit, patient states that she is doing well with PT. States that her ROM is still limited, but improving after every session. Patient is interested in another injection as she continues to have pain with activities and night time.  She is unable to take NSAIDs due to bleeding disorder.

## 2023-09-06 NOTE — PROCEDURE
[Left] : of the left [Subacromial Space] : subacromial space [Pain] : pain [Inflammation] : inflammation [Alcohol] : alcohol [Betadine] : betadine [Ethyl Chloride sprayed topically] : ethyl chloride sprayed topically [Sterile technique used] : sterile technique used [___ cc    3mg] :  Betamethasone (Celestone) ~Vcc of 3mg [___ cc    1%] : Lidocaine ~Vcc of 1%  [] : Patient tolerated procedure well [Call if redness, pain or fever occur] : call if redness, pain or fever occur [Apply ice for 15min out of every hour for the next 12-24 hours as tolerated] : apply ice for 15 minutes out of every hour for the next 12-24 hours as tolerated [Patient was advised to rest the joint(s) for ____ days] : patient was advised to rest the joint(s) for [unfilled] days [Previous OTC use and PT nontherapeutic] : patient has tried OTC's including aspirin, Ibuprofen, Aleve, etc or prescription NSAIDS, and/or exercises at home and/or physical therapy without satisfactory response [Patient had decreased mobility in the joint] : patient had decreased mobility in the joint [Risks, benefits, alternatives discussed / Verbal consent obtained] : the risks benefits, and alternatives have been discussed, and verbal consent was obtained

## 2023-09-06 NOTE — DISCUSSION/SUMMARY
[de-identified] : I discussed her condition and treatment course.  She tolerated injection well today.  Continue PT and HEP.  Follow up in 6 weeks.  Patient voiced understanding and agreement with the plan.

## 2023-10-17 ENCOUNTER — APPOINTMENT (OUTPATIENT)
Dept: ORTHOPEDIC SURGERY | Facility: CLINIC | Age: 48
End: 2023-10-17
Payer: OTHER MISCELLANEOUS

## 2023-10-17 PROCEDURE — 99213 OFFICE O/P EST LOW 20 MIN: CPT

## 2023-10-23 ENCOUNTER — OUTPATIENT (OUTPATIENT)
Dept: OUTPATIENT SERVICES | Facility: HOSPITAL | Age: 48
LOS: 1 days | End: 2023-10-23

## 2023-10-23 DIAGNOSIS — R79.9 ABNORMAL FINDING OF BLOOD CHEMISTRY, UNSPECIFIED: ICD-10-CM

## 2023-10-30 ENCOUNTER — APPOINTMENT (OUTPATIENT)
Dept: HEMATOLOGY ONCOLOGY | Facility: CLINIC | Age: 48
End: 2023-10-30

## 2023-12-05 ENCOUNTER — APPOINTMENT (OUTPATIENT)
Dept: ORTHOPEDIC SURGERY | Facility: CLINIC | Age: 48
End: 2023-12-05
Payer: OTHER MISCELLANEOUS

## 2023-12-05 PROCEDURE — 99212 OFFICE O/P EST SF 10 MIN: CPT

## 2024-01-03 ENCOUNTER — APPOINTMENT (OUTPATIENT)
Dept: MAMMOGRAPHY | Facility: CLINIC | Age: 49
End: 2024-01-03
Payer: COMMERCIAL

## 2024-01-03 ENCOUNTER — APPOINTMENT (OUTPATIENT)
Dept: ULTRASOUND IMAGING | Facility: CLINIC | Age: 49
End: 2024-01-03

## 2024-01-03 PROCEDURE — 77063 BREAST TOMOSYNTHESIS BI: CPT

## 2024-01-03 PROCEDURE — 77067 SCR MAMMO BI INCL CAD: CPT

## 2024-01-16 ENCOUNTER — APPOINTMENT (OUTPATIENT)
Dept: ORTHOPEDIC SURGERY | Facility: CLINIC | Age: 49
End: 2024-01-16
Payer: OTHER MISCELLANEOUS

## 2024-01-16 DIAGNOSIS — M75.02 ADHESIVE CAPSULITIS OF LEFT SHOULDER: ICD-10-CM

## 2024-01-16 PROCEDURE — 99212 OFFICE O/P EST SF 10 MIN: CPT

## 2024-01-16 NOTE — DISCUSSION/SUMMARY
[de-identified] : I discussed her condition and treatment course.  Continue with HEP.  Follow up as needed.  Patient voiced understanding and agreement with the plan.

## 2024-01-16 NOTE — HISTORY OF PRESENT ILLNESS
[de-identified] :  Since last visit, patient states she is doing well with not going to PT, but rather continuing with daily HEP and trying to use her left arm for ADLs including lifting groceries.

## 2024-01-16 NOTE — PHYSICAL EXAM
[Left] : left shoulder [5 ___] : forward flexion 5[unfilled]/5 [5___] : abduction 5[unfilled]/5 [] : negative impingement testing [TWNoteComboBox7] : active forward flexion 160 degrees [de-identified] : active abduction 140 degrees [TWNoteComboBox6] : internal rotation L4 [de-identified] : external rotation 70 degrees

## 2024-01-22 ENCOUNTER — OUTPATIENT (OUTPATIENT)
Dept: OUTPATIENT SERVICES | Facility: HOSPITAL | Age: 49
LOS: 1 days | End: 2024-01-22

## 2024-01-22 DIAGNOSIS — R79.89 OTHER SPECIFIED ABNORMAL FINDINGS OF BLOOD CHEMISTRY: ICD-10-CM

## 2024-02-08 ENCOUNTER — APPOINTMENT (OUTPATIENT)
Dept: HEMATOLOGY ONCOLOGY | Facility: CLINIC | Age: 49
End: 2024-02-08
Payer: COMMERCIAL

## 2024-02-08 ENCOUNTER — LABORATORY RESULT (OUTPATIENT)
Age: 49
End: 2024-02-08

## 2024-02-08 VITALS
TEMPERATURE: 98 F | SYSTOLIC BLOOD PRESSURE: 108 MMHG | WEIGHT: 118 LBS | OXYGEN SATURATION: 99 % | HEIGHT: 63 IN | BODY MASS INDEX: 20.91 KG/M2 | DIASTOLIC BLOOD PRESSURE: 68 MMHG | HEART RATE: 71 BPM

## 2024-02-08 DIAGNOSIS — D68.00 VON WILLEBRAND DISEASE, UNSPECIFIED: ICD-10-CM

## 2024-02-08 LAB
HCT VFR BLD CALC: 36.7 %
HGB BLD-MCNC: 12.4 G/DL
MCHC RBC-ENTMCNC: 31.2 PG
MCHC RBC-ENTMCNC: 33.8 GM/DL
MCV RBC AUTO: 92.2 FL
PLATELET # BLD AUTO: 250 K/UL
RBC # BLD: 3.98 M/UL
RBC # FLD: 12.1 %
WBC # FLD AUTO: 6.46 K/UL

## 2024-02-08 PROCEDURE — 85027 COMPLETE CBC AUTOMATED: CPT

## 2024-02-08 PROCEDURE — 99214 OFFICE O/P EST MOD 30 MIN: CPT

## 2024-02-09 PROBLEM — D68.00 VON WILLEBRAND DISEASE: Status: ACTIVE | Noted: 2022-07-27

## 2024-02-09 LAB — FERRITIN SERPL-MCNC: 427 NG/ML

## 2024-02-09 NOTE — PHYSICAL EXAM
[Fully active, able to carry on all pre-disease performance without restriction] : Status 0 - Fully active, able to carry on all pre-disease performance without restriction [Normal] : affect appropriate [de-identified] : generally well appearing young female, NAD, pleasant  [de-identified] : nodular breast tissue bilaterally, no discrete breast masses

## 2024-02-09 NOTE — HISTORY OF PRESENT ILLNESS
[de-identified] : Referred by: Dr. Elda Juarez  Ms. Silva presented at age 47 in July 2022 for evaluation of von willebrand disease, elevated ferritin.  The patient has a medical history of anxiety, Anorexia nervosa, SVT s/p cardiac ablation, migraines, osteopenia, von willebrand disease, unknown type.   Presenting HPI: The patient is here to transfer care from [facility].  She reports that she was previously diagnosed with von Willebrand disorder by a hematologist in [facility] Dr. Humza Sorto at [facility] over 20 years ago.  She underwent this work-up because she had extremely heavy bleeding with her menses and had an episode of hemorrhage after oral surgery. The patient had 1 vaginal delivery 12 years ago which was unremarkable, no major bleeding noted. She was unable to get pregnant after that delivery. She subsequently underwent uterine ablation and had significant improvement in her menses. She had a recent nasal procedure and required 3 doses of DDAVP (Stimate) to control her bleeding. She never required a blood transfusion. She also reports that her grandmother had a bleeding disorder, likely von Willebrand as well.    She has seen multiple hematologists over the past several years who told her that she did not in fact have VWD, however she continues to suffer from bleeding episodes with surgery. She moved out to [facility] in 2003 and most recently was following with Dr. Gamboa and Dr. Underwood. She was also receiving intermittent IV iron infusions for iron deficiency anemia, most recently 6 years ago. She recently was found to have elevated ferritin which is being monitored.   She reports a significant history of Anorexia, gained 30lb over the past year and is doing a lot of yoga, meditation, acupuncture, massage. She does report generally low blood pressure and has intermittent dizziness. She also reports chronic fatigue. She reports that she is due for a colonoscopy, had some blood in her stools.   Laboratory studies from 6/17/22 reviewed and notable for: WBC 5.3, Hb 12.7, MCV 91, Plt 177  Hemoglobin electrophoresis: HbA 97.7, HbA2 2.3, HbS 0 Iron 83, TIBC 274, Tsat 30%, ferritin 367 Ferritin 705 in 2019   HCM:  - COVID vaccination: s/p 2 doses and 1 booster  - Colonoscopy: DUE for colonoscopy  - Gyn: DUE, last done in 2018/2019, has appointment in October  - Mammo: DUE for mammogram  - DEXA- osteopenia, DUE   SH:  - Occupation: teaches [employer]  - Living situation: lives in [place], with her  and 12 year old daughter  - Smoking/etoh/illicits: never smoker, rare etoh, denies illicits  - Exercise: does yoga   FH:  - Her mother had breast cancer at age 55, recurrence at age 65 - Grandmother had a bleeding disorder, unknown type [de-identified] : Srinath presents for follow up on 2/8/24 for history of von willebrand disease.   At today's visit she reports she was having a lot of allergy symptoms/itching, which let to bruising on her lower extremities. She decided against doing the platelet aggregation studies.  She is concerned that her ferritin remains elevated- has been consistently in the 400 range.  Pending colonoscopy- thinking about for the summer. She denies blood in her urine or stools.   Breast MRI in August 2023 showed a small right breast lesion, MMG January 2024 BIRADS 1, pending repeat breast MRI in March 2024.   CBC from today reviewed and within normal limits. Ferritin 427   #HCM:  Mammo 7/29/22- negative, BIRADS 1- due for MRI breast 3/2024 Referral to GI for colonoscopy today Gyn UTD

## 2024-02-09 NOTE — RESULTS/DATA
[FreeTextEntry1] : Ms. Silva presented at age 47 in July 2022 for evaluation of von willebrand disease, elevated ferritin.  The patient has a medical history of anxiety, Anorexia nervosa, SVT s/p cardiac ablation, migraines, osteopenia, von willebrand disease, unknown type.   Von willebrand studies negative but clinically + for VWD.  Continues to have persistent bruising.  Von willebrand activity normal. Declined platelet aggregation studies- has been very difficult to arrange.  Recommend DDAVP prior to invasive procedures including colonoscopy/ possible breast biopsy.  Elevated ferritin- stable in the 400 range. Unclear etiology, ?chronic inflammation.  Prior hemochromatosis testing negative.  Pending colonoscopy this summer, will arrange DDAVP prior to colonoscopy.  HCM:  Mammo 7/29/22- negative, BIRADS 1- due for MRI breast 3/2024 - DUE for colonoscopy, GI referral provided   RTC 1 year for follow up, or sooner PRN.  All patient questions answered at today's visit. Patient urged to call the office with any questions or concerns.

## 2024-02-12 LAB — VWF:RCO ACT/NOR PPP PL AGG: 79 %

## 2024-04-23 ENCOUNTER — APPOINTMENT (OUTPATIENT)
Dept: MRI IMAGING | Facility: CLINIC | Age: 49
End: 2024-04-23
Payer: COMMERCIAL

## 2024-04-23 PROCEDURE — A9585: CPT

## 2024-04-23 PROCEDURE — 77049 MRI BREAST C-+ W/CAD BI: CPT

## 2024-08-26 ENCOUNTER — NON-APPOINTMENT (OUTPATIENT)
Age: 49
End: 2024-08-26

## 2025-02-04 ENCOUNTER — OUTPATIENT (OUTPATIENT)
Dept: OUTPATIENT SERVICES | Facility: HOSPITAL | Age: 50
LOS: 1 days | End: 2025-02-04
Payer: COMMERCIAL

## 2025-02-04 DIAGNOSIS — R79.89 OTHER SPECIFIED ABNORMAL FINDINGS OF BLOOD CHEMISTRY: ICD-10-CM

## 2025-02-06 ENCOUNTER — RESULT REVIEW (OUTPATIENT)
Age: 50
End: 2025-02-06

## 2025-02-06 ENCOUNTER — APPOINTMENT (OUTPATIENT)
Dept: HEMATOLOGY ONCOLOGY | Facility: CLINIC | Age: 50
End: 2025-02-06
Payer: COMMERCIAL

## 2025-02-06 VITALS
BODY MASS INDEX: 20.73 KG/M2 | HEIGHT: 63 IN | WEIGHT: 117 LBS | OXYGEN SATURATION: 100 % | TEMPERATURE: 98.1 F | HEART RATE: 78 BPM | SYSTOLIC BLOOD PRESSURE: 104 MMHG | DIASTOLIC BLOOD PRESSURE: 68 MMHG

## 2025-02-06 DIAGNOSIS — D68.00 VON WILLEBRAND DISEASE, UNSPECIFIED: ICD-10-CM

## 2025-02-06 LAB
BASOPHILS # BLD AUTO: 0.02 K/UL — SIGNIFICANT CHANGE UP (ref 0–0.2)
BASOPHILS NFR BLD AUTO: 0.3 % — SIGNIFICANT CHANGE UP (ref 0–2)
EOSINOPHIL # BLD AUTO: 0.1 K/UL — SIGNIFICANT CHANGE UP (ref 0–0.5)
EOSINOPHIL NFR BLD AUTO: 1.5 % — SIGNIFICANT CHANGE UP (ref 0–6)
HCT VFR BLD CALC: 37.5 % — SIGNIFICANT CHANGE UP (ref 34.5–45)
HGB BLD-MCNC: 12.6 G/DL — SIGNIFICANT CHANGE UP (ref 11.5–15.5)
IMM GRANULOCYTES NFR BLD AUTO: 0.3 % — SIGNIFICANT CHANGE UP (ref 0–0.9)
LYMPHOCYTES # BLD AUTO: 1.97 K/UL — SIGNIFICANT CHANGE UP (ref 1–3.3)
LYMPHOCYTES # BLD AUTO: 30.2 % — SIGNIFICANT CHANGE UP (ref 13–44)
MCHC RBC-ENTMCNC: 31.7 PG — SIGNIFICANT CHANGE UP (ref 27–34)
MCHC RBC-ENTMCNC: 33.6 G/DL — SIGNIFICANT CHANGE UP (ref 32–36)
MCV RBC AUTO: 94.2 FL — SIGNIFICANT CHANGE UP (ref 80–100)
MONOCYTES # BLD AUTO: 0.32 K/UL — SIGNIFICANT CHANGE UP (ref 0–0.9)
MONOCYTES NFR BLD AUTO: 4.9 % — SIGNIFICANT CHANGE UP (ref 2–14)
NEUTROPHILS # BLD AUTO: 4.09 K/UL — SIGNIFICANT CHANGE UP (ref 1.8–7.4)
NEUTROPHILS NFR BLD AUTO: 62.8 % — SIGNIFICANT CHANGE UP (ref 43–77)
NRBC # BLD: 0 /100 WBCS — SIGNIFICANT CHANGE UP (ref 0–0)
NRBC BLD-RTO: 0 /100 WBCS — SIGNIFICANT CHANGE UP (ref 0–0)
PLATELET # BLD AUTO: 210 K/UL — SIGNIFICANT CHANGE UP (ref 150–400)
RBC # BLD: 3.98 M/UL — SIGNIFICANT CHANGE UP (ref 3.8–5.2)
RBC # FLD: 12.7 % — SIGNIFICANT CHANGE UP (ref 10.3–14.5)
WBC # FLD AUTO: 6.52 K/UL — SIGNIFICANT CHANGE UP (ref 3.8–10.5)

## 2025-02-06 PROCEDURE — 99214 OFFICE O/P EST MOD 30 MIN: CPT

## 2025-02-06 PROCEDURE — 85027 COMPLETE CBC AUTOMATED: CPT

## 2025-02-06 PROCEDURE — G2211 COMPLEX E/M VISIT ADD ON: CPT | Mod: NC

## 2025-02-07 LAB
ALBUMIN SERPL ELPH-MCNC: 4.7 G/DL
ALP BLD-CCNC: 73 U/L
ALT SERPL-CCNC: 7 U/L
ANION GAP SERPL CALC-SCNC: 11 MMOL/L
AST SERPL-CCNC: 17 U/L
BILIRUB SERPL-MCNC: 0.5 MG/DL
BUN SERPL-MCNC: 17 MG/DL
CALCIUM SERPL-MCNC: 9.9 MG/DL
CHLORIDE SERPL-SCNC: 102 MMOL/L
CO2 SERPL-SCNC: 27 MMOL/L
CREAT SERPL-MCNC: 0.64 MG/DL
EGFR: 108 ML/MIN/1.73M2
FERRITIN SERPL-MCNC: 121 NG/ML
GLUCOSE SERPL-MCNC: 109 MG/DL
POTASSIUM SERPL-SCNC: 4.1 MMOL/L
PROT SERPL-MCNC: 6.5 G/DL
SODIUM SERPL-SCNC: 140 MMOL/L
VWF:RCO ACT/NOR PPP PL AGG: 75 %

## 2025-02-21 ENCOUNTER — APPOINTMENT (OUTPATIENT)
Dept: MRI IMAGING | Facility: CLINIC | Age: 50
End: 2025-02-21
Payer: COMMERCIAL

## 2025-02-21 PROCEDURE — 0866T QUAN MRI ALYS BRN W/DX MRI: CPT

## 2025-02-21 PROCEDURE — 70551 MRI BRAIN STEM W/O DYE: CPT

## 2025-09-05 ENCOUNTER — APPOINTMENT (OUTPATIENT)
Dept: HEMOPHILIA TREATMENT | Facility: HOSPITAL | Age: 50
End: 2025-09-05

## 2025-09-05 ENCOUNTER — OUTPATIENT (OUTPATIENT)
Dept: OUTPATIENT SERVICES | Age: 50
LOS: 1 days | End: 2025-09-05

## 2025-09-05 VITALS — TEMPERATURE: 98.6 F | HEART RATE: 67 BPM | SYSTOLIC BLOOD PRESSURE: 120 MMHG | DIASTOLIC BLOOD PRESSURE: 76 MMHG

## 2025-09-05 DIAGNOSIS — D68.00 VON WILLEBRAND DISEASE, UNSPECIFIED: ICD-10-CM

## 2025-09-05 DIAGNOSIS — Z86.59 PERSONAL HISTORY OF OTHER MENTAL AND BEHAVIORAL DISORDERS: ICD-10-CM

## 2025-09-05 DIAGNOSIS — G43.909 MIGRAINE, UNSPECIFIED, NOT INTRACTABLE, W/OUT STATUS MIGRAINOSUS: ICD-10-CM

## 2025-09-05 DIAGNOSIS — F41.9 ANXIETY DISORDER, UNSPECIFIED: ICD-10-CM

## 2025-09-05 DIAGNOSIS — Z86.79 PERSONAL HISTORY OF OTHER DISEASES OF THE CIRCULATORY SYSTEM: ICD-10-CM

## 2025-09-05 DIAGNOSIS — M85.80 OTHER SPECIFIED DISORDERS OF BONE DENSITY AND STRUCTURE, UNSPECIFIED SITE: ICD-10-CM

## 2025-09-08 LAB
APTT BLD: 32 SEC
AUTO BASOPHILS #: 0.02 K/UL
AUTO BASOPHILS %: 0.5 %
AUTO EOSINOPHILS #: 0.09 K/UL
AUTO EOSINOPHILS %: 2.1 %
AUTO IMMATURE GRANULOCYTES #: 0.01 K/UL
AUTO LYMPHOCYTES #: 1.71 K/UL
AUTO LYMPHOCYTES %: 39.5 %
AUTO MONOCYTES #: 0.27 K/UL
AUTO MONOCYTES %: 6.2 %
AUTO NEUTROPHILS #: 2.23 K/UL
AUTO NEUTROPHILS %: 51.5 %
AUTO NRBC #: 0 K/UL
FACT VIII ACT/NOR PPP: 82.5 %
FIBRINOGEN PPP-MCNC: 238 MG/DL
HCT VFR BLD CALC: 38.7 %
HGB BLD-MCNC: 12.9 G/DL
IMM GRANULOCYTES NFR BLD AUTO: 0.2 %
IMMATURE RETICULOCYTE FRACTION %: 6.3 %
INR PPP: 0.96 RATIO
IRON SATN MFR SERPL: 51 %
IRON SERPL-MCNC: 132 UG/DL
MAN DIFF?: NORMAL
MCHC RBC-ENTMCNC: 30.6 PG
MCHC RBC-ENTMCNC: 33.3 G/DL
MCV RBC AUTO: 91.7 FL
PLATELET # BLD AUTO: 185 K/UL
PMV BLD AUTO: 0 /100 WBCS
PMV BLD: 10 FL
PT BLD: 11.1 SEC
RBC # BLD: 4.22 M/UL
RBC # BLD: 4.22 M/UL
RBC # FLD: 12.8 %
RETICS # AUTO: 1.3 %
RETICS AGGREG/RBC NFR: 52.8 K/UL
RETICULOCYTE HEMOGLOBIN EQUIVALENT: 35 PG
STFR SERPL-MCNC: 15.7 NMOL/L
TIBC SERPL-MCNC: 261 UG/DL
TT CONT PPP: 14 SEC
UIBC SERPL-MCNC: 129 UG/DL
VWF AG PPP IA-ACNC: 73 %
VWF:RCO ACT/NOR PPP PL AGG: 62 %
WBC # FLD AUTO: 4.33 K/UL

## 2025-09-17 ENCOUNTER — NON-APPOINTMENT (OUTPATIENT)
Age: 50
End: 2025-09-17